# Patient Record
Sex: FEMALE | Race: AMERICAN INDIAN OR ALASKA NATIVE
[De-identification: names, ages, dates, MRNs, and addresses within clinical notes are randomized per-mention and may not be internally consistent; named-entity substitution may affect disease eponyms.]

---

## 2018-02-24 ENCOUNTER — HOSPITAL ENCOUNTER (EMERGENCY)
Dept: HOSPITAL 31 - C.ER | Age: 43
Discharge: HOME | End: 2018-02-24
Payer: COMMERCIAL

## 2018-02-24 VITALS
OXYGEN SATURATION: 98 % | TEMPERATURE: 98.5 F | HEART RATE: 99 BPM | DIASTOLIC BLOOD PRESSURE: 78 MMHG | SYSTOLIC BLOOD PRESSURE: 116 MMHG | RESPIRATION RATE: 20 BRPM

## 2018-02-24 DIAGNOSIS — R50.9: ICD-10-CM

## 2018-02-24 DIAGNOSIS — J11.1: Primary | ICD-10-CM

## 2018-02-24 NOTE — C.PDOC
History Of Present Illness


43 y/o female presents to the ED for evaluation of dry cough, fever, myalgia, 

and malaise since yesterday. Patient reports TM of 100.6. Patient reports sore 

throat due to coughing. Reports limited relief with OTC cold/flu medication. 





DRY COUGH, FEVER, MYALGIA MALAISE SINCE YEST. .6. SORE THROAT DUE TO 

COUGHING. LIMITED RELIEF W OTC COLD/FLU MEDS





EXAM


NONTOXIC


HEENT NEG


LUNGS CTA B/L NO W/R/R


REMAINDE RNEG


Time Seen by Provider: 18 17:59


Chief Complaint (Nursing): Flu-like Symptoms


History Per: Patient


History/Exam Limitations: no limitations


Onset/Duration Of Symptoms: Hrs


Current Symptoms Are (Timing): Still Present


Location Of Pain: Diffuse Myalgias


Associated Symptoms: Fever, Cough, Myalgias


Additional History Per: Patient





Past Medical History


Reviewed: Historical Data, Nursing Documentation, Vital Signs


Vital Signs: 


 Last Vital Signs











Temp  98.5 F   18 17:44


 


Pulse  99 H  18 17:44


 


Resp  20   18 17:44


 


BP  116/78   18 17:44


 


Pulse Ox  98   18 18:08














- Medical History


PMH: Migraine


Surgical History: 





- [facility]








TETANUS TOXOID ADMINIST (10/04/04)








Family History: States: Unknown Family Hx





- Social History


Hx Tobacco Use: No


Hx Alcohol Use: No


Hx Substance Use: No





- Immunization History


Hx Tetanus Toxoid Vaccination: No


Hx Influenza Vaccination: No


Hx Pneumococcal Vaccination: No





Review Of Systems


Constitutional: Positive for: Fever, Malaise


Respiratory: Positive for: Cough


Musculoskeletal: Positive for: Other (myalgias )





Physical Exam





- Physical Exam


Appears: Non-toxic, No Acute Distress


Skin: Normal Color, Warm, Dry


Head: Atraumatic, Normacephalic


Eye(s): bilateral: Normal Inspection


Ear(s): Bilateral: Normal


Nose: Normal, No Discharge


Oral Mucosa: Moist


Throat: Normal, No Erythema, No Exudate


Neck: Supple


Chest: Symmetrical, No Deformity, No Tenderness


Cardiovascular: Rhythm Regular, No Murmur


Respiratory: Normal Breath Sounds, No Rales, No Rhonchi, No Wheezing, Other (

clear to auscultation bilaterally)


Extremity: Normal ROM, Capillary Refill (less than 2 seconds )


Neurological/Psych: Oriented x3, Normal Speech, Normal Cognition





ED Course And Treatment


O2 Sat by Pulse Oximetry: 98 (on RA )


Pulse Ox Interpretation: Normal


Progress Note: Tamiflu PO and Tessalon Perles PO administered.





Disposition


Counseled Patient/Family Regarding: Diagnosis, Need For Followup, Rx Given





- Disposition


Referrals: 


YOUR,PMD [Other]


Disposition: HOME/ ROUTINE


Disposition Time: 18:07


Condition: IMPROVED


Prescriptions: 


Benzonatate [Tessalon Perles] 200 mg PO TID PRN #15 sgl


 PRN Reason: Cough


Oseltamivir [Tamiflu] 75 mg PO BID #9 cap


Instructions:  Flu, Adult (DC)


Forms:  [facility] (English), Work Excuse





- Clinical Impression


Clinical Impression: 


 Influenza-like illness, Cough








- Scribe Statement


The provider has reviewed the documentation as recorded by the Scribe (Verito Shelton)


Provider Attestation: 








All medical record entries made by the Scribe were at my direction and 

personally dictated by me. I have reviewed the chart and agree that the record 

accurately reflects my personal performance of the history, physical exam, 

medical decision making, and the department course for this patient. I have 

also personally directed, reviewed, and agree with the discharge instructions 

and disposition.

## 2024-06-27 ENCOUNTER — NEW PATIENT COMPREHENSIVE (OUTPATIENT)
Dept: URBAN - METROPOLITAN AREA CLINIC 110 | Facility: CLINIC | Age: 49
End: 2024-06-27

## 2024-06-27 DIAGNOSIS — H04.123: ICD-10-CM

## 2024-06-27 DIAGNOSIS — H40.053: ICD-10-CM

## 2024-06-27 PROCEDURE — 92133 CPTRZD OPH DX IMG PST SGM ON: CPT

## 2024-06-27 PROCEDURE — 92004 COMPRE OPH EXAM NEW PT 1/>: CPT

## 2024-06-27 PROCEDURE — 92020 GONIOSCOPY: CPT

## 2024-06-27 ASSESSMENT — VISUAL ACUITY
OS_SC: 20/25-1
OU_SC: J3
OS_SC: J3
OD_SC: 20/25-1
OD_SC: J5

## 2024-06-27 ASSESSMENT — TONOMETRY
OS_IOP_MMHG: 24
OS_IOP_MMHG: 21
OD_IOP_MMHG: 27
OD_IOP_MMHG: 30

## 2024-08-02 ENCOUNTER — FOLLOW UP (OUTPATIENT)
Dept: URBAN - METROPOLITAN AREA CLINIC 110 | Facility: CLINIC | Age: 49
End: 2024-08-02

## 2024-08-02 DIAGNOSIS — H40.053: ICD-10-CM

## 2024-08-02 PROCEDURE — 76514 ECHO EXAM OF EYE THICKNESS: CPT

## 2024-08-02 PROCEDURE — 92012 INTRM OPH EXAM EST PATIENT: CPT

## 2024-08-02 PROCEDURE — 92083 EXTENDED VISUAL FIELD XM: CPT

## 2024-08-02 ASSESSMENT — PACHYMETRY
OS_CT_UM: 539
OD_CT_UM: 539

## 2024-08-02 ASSESSMENT — TONOMETRY
OS_IOP_MMHG: 19
OS_IOP_MMHG: 21
OD_IOP_MMHG: 30
OD_IOP_MMHG: 26

## 2024-08-02 ASSESSMENT — VISUAL ACUITY
OS_SC: 20/30
OD_SC: 20/30-1

## 2024-09-06 ENCOUNTER — FOLLOW UP (OUTPATIENT)
Dept: URBAN - METROPOLITAN AREA CLINIC 110 | Facility: CLINIC | Age: 49
End: 2024-09-06

## 2024-09-06 DIAGNOSIS — H04.123: ICD-10-CM

## 2024-09-06 DIAGNOSIS — H40.053: ICD-10-CM

## 2024-09-06 PROCEDURE — 92083 EXTENDED VISUAL FIELD XM: CPT | Mod: NC

## 2024-09-06 PROCEDURE — 92012 INTRM OPH EXAM EST PATIENT: CPT

## 2024-09-06 ASSESSMENT — TONOMETRY
OS_IOP_MMHG: 22
OD_IOP_MMHG: 26

## 2024-09-06 ASSESSMENT — VISUAL ACUITY
OD_SC: 20/25-3
OS_SC: 20/25

## 2025-03-14 ENCOUNTER — ESTABLISHED COMPREHENSIVE EXAM (OUTPATIENT)
Dept: URBAN - METROPOLITAN AREA CLINIC 110 | Facility: CLINIC | Age: 50
End: 2025-03-14

## 2025-03-14 DIAGNOSIS — H04.123: ICD-10-CM

## 2025-03-14 DIAGNOSIS — H40.053: ICD-10-CM

## 2025-03-14 PROCEDURE — 92014 COMPRE OPH EXAM EST PT 1/>: CPT

## 2025-03-14 PROCEDURE — 92133 CPTRZD OPH DX IMG PST SGM ON: CPT

## 2025-03-14 ASSESSMENT — VISUAL ACUITY
OD_SC: 20/30-1
OS_SC: 20/30-1
OU_SC: J3

## 2025-03-14 ASSESSMENT — TONOMETRY
OD_IOP_MMHG: 31
OD_IOP_MMHG: 30
OS_IOP_MMHG: 24
OS_IOP_MMHG: 22

## 2025-04-25 ENCOUNTER — FOLLOW UP (OUTPATIENT)
Dept: URBAN - METROPOLITAN AREA CLINIC 110 | Facility: CLINIC | Age: 50
End: 2025-04-25

## 2025-04-25 DIAGNOSIS — H04.123: ICD-10-CM

## 2025-04-25 DIAGNOSIS — H40.053: ICD-10-CM

## 2025-04-25 PROCEDURE — 92012 INTRM OPH EXAM EST PATIENT: CPT

## 2025-04-25 ASSESSMENT — TONOMETRY
OD_IOP_MMHG: 20
OS_IOP_MMHG: 17

## 2025-04-25 ASSESSMENT — VISUAL ACUITY
OS_SC: 20/25-1
OD_SC: 20/25